# Patient Record
Sex: MALE | Race: BLACK OR AFRICAN AMERICAN | Employment: PART TIME | URBAN - METROPOLITAN AREA
[De-identification: names, ages, dates, MRNs, and addresses within clinical notes are randomized per-mention and may not be internally consistent; named-entity substitution may affect disease eponyms.]

---

## 2021-03-04 ENCOUNTER — HOSPITAL ENCOUNTER (EMERGENCY)
Age: 21
Discharge: HOME | End: 2021-03-04
Attending: EMERGENCY MEDICINE
Payer: COMMERCIAL

## 2021-03-04 ENCOUNTER — APPOINTMENT (OUTPATIENT)
Dept: GENERAL RADIOLOGY | Age: 21
End: 2021-03-04
Payer: COMMERCIAL

## 2021-03-04 VITALS
SYSTOLIC BLOOD PRESSURE: 121 MMHG | TEMPERATURE: 99.2 F | RESPIRATION RATE: 18 BRPM | HEART RATE: 112 BPM | OXYGEN SATURATION: 97 % | DIASTOLIC BLOOD PRESSURE: 81 MMHG

## 2021-03-04 DIAGNOSIS — Z20.822 SUSPECTED COVID-19 VIRUS INFECTION: Primary | ICD-10-CM

## 2021-03-04 LAB
A/G RATIO: 1.3 (ref 1.1–2.2)
ALBUMIN SERPL-MCNC: 4.3 G/DL (ref 3.4–5)
ALP BLD-CCNC: 96 U/L (ref 40–129)
ALT SERPL-CCNC: 54 U/L (ref 10–40)
ANION GAP SERPL CALCULATED.3IONS-SCNC: 6 MMOL/L (ref 3–16)
AST SERPL-CCNC: 30 U/L (ref 15–37)
BASOPHILS ABSOLUTE: 0 K/UL (ref 0–0.2)
BASOPHILS RELATIVE PERCENT: 0.2 %
BILIRUB SERPL-MCNC: 0.5 MG/DL (ref 0–1)
BILIRUBIN URINE: NEGATIVE
BLOOD, URINE: NEGATIVE
BUN BLDV-MCNC: 11 MG/DL (ref 7–20)
CALCIUM SERPL-MCNC: 9.6 MG/DL (ref 8.3–10.6)
CHLORIDE BLD-SCNC: 106 MMOL/L (ref 99–110)
CLARITY: CLEAR
CO2: 27 MMOL/L (ref 21–32)
COLOR: YELLOW
CREAT SERPL-MCNC: 1 MG/DL (ref 0.9–1.3)
EOSINOPHILS ABSOLUTE: 0.1 K/UL (ref 0–0.6)
EOSINOPHILS RELATIVE PERCENT: 1.2 %
GFR AFRICAN AMERICAN: >60
GFR NON-AFRICAN AMERICAN: >60
GLOBULIN: 3.4 G/DL
GLUCOSE BLD-MCNC: 120 MG/DL (ref 70–99)
GLUCOSE URINE: NEGATIVE MG/DL
HCT VFR BLD CALC: 46.7 % (ref 40.5–52.5)
HEMOGLOBIN: 14.9 G/DL (ref 13.5–17.5)
KETONES, URINE: NEGATIVE MG/DL
LEUKOCYTE ESTERASE, URINE: NEGATIVE
LYMPHOCYTES ABSOLUTE: 0.5 K/UL (ref 1–5.1)
LYMPHOCYTES RELATIVE PERCENT: 7.4 %
MCH RBC QN AUTO: 24.9 PG (ref 26–34)
MCHC RBC AUTO-ENTMCNC: 32 G/DL (ref 31–36)
MCV RBC AUTO: 78 FL (ref 80–100)
MICROSCOPIC EXAMINATION: NORMAL
MONOCYTES ABSOLUTE: 0.6 K/UL (ref 0–1.3)
MONOCYTES RELATIVE PERCENT: 8.1 %
NEUTROPHILS ABSOLUTE: 5.8 K/UL (ref 1.7–7.7)
NEUTROPHILS RELATIVE PERCENT: 83.1 %
NITRITE, URINE: NEGATIVE
PDW BLD-RTO: 14.4 % (ref 12.4–15.4)
PH UA: 8 (ref 5–8)
PLATELET # BLD: 237 K/UL (ref 135–450)
PMV BLD AUTO: 7.4 FL (ref 5–10.5)
POTASSIUM REFLEX MAGNESIUM: 4 MMOL/L (ref 3.5–5.1)
PROTEIN UA: NEGATIVE MG/DL
RAPID INFLUENZA  B AGN: NEGATIVE
RAPID INFLUENZA A AGN: NEGATIVE
RBC # BLD: 5.99 M/UL (ref 4.2–5.9)
SODIUM BLD-SCNC: 139 MMOL/L (ref 136–145)
SPECIFIC GRAVITY UA: 1.02 (ref 1–1.03)
TOTAL CK: 455 U/L (ref 39–308)
TOTAL PROTEIN: 7.7 G/DL (ref 6.4–8.2)
URINE REFLEX TO CULTURE: NORMAL
URINE TYPE: NORMAL
UROBILINOGEN, URINE: 1 E.U./DL
WBC # BLD: 7 K/UL (ref 4–11)

## 2021-03-04 PROCEDURE — 82550 ASSAY OF CK (CPK): CPT

## 2021-03-04 PROCEDURE — 80053 COMPREHEN METABOLIC PANEL: CPT

## 2021-03-04 PROCEDURE — 99284 EMERGENCY DEPT VISIT MOD MDM: CPT

## 2021-03-04 PROCEDURE — 96375 TX/PRO/DX INJ NEW DRUG ADDON: CPT

## 2021-03-04 PROCEDURE — 81003 URINALYSIS AUTO W/O SCOPE: CPT

## 2021-03-04 PROCEDURE — 87804 INFLUENZA ASSAY W/OPTIC: CPT

## 2021-03-04 PROCEDURE — 96374 THER/PROPH/DIAG INJ IV PUSH: CPT

## 2021-03-04 PROCEDURE — U0003 INFECTIOUS AGENT DETECTION BY NUCLEIC ACID (DNA OR RNA); SEVERE ACUTE RESPIRATORY SYNDROME CORONAVIRUS 2 (SARS-COV-2) (CORONAVIRUS DISEASE [COVID-19]), AMPLIFIED PROBE TECHNIQUE, MAKING USE OF HIGH THROUGHPUT TECHNOLOGIES AS DESCRIBED BY CMS-2020-01-R: HCPCS

## 2021-03-04 PROCEDURE — 2580000003 HC RX 258: Performed by: PHYSICIAN ASSISTANT

## 2021-03-04 PROCEDURE — 6360000002 HC RX W HCPCS: Performed by: PHYSICIAN ASSISTANT

## 2021-03-04 PROCEDURE — 71045 X-RAY EXAM CHEST 1 VIEW: CPT

## 2021-03-04 PROCEDURE — 85025 COMPLETE CBC W/AUTO DIFF WBC: CPT

## 2021-03-04 RX ORDER — KETOROLAC TROMETHAMINE 30 MG/ML
30 INJECTION, SOLUTION INTRAMUSCULAR; INTRAVENOUS ONCE
Status: COMPLETED | OUTPATIENT
Start: 2021-03-04 | End: 2021-03-04

## 2021-03-04 RX ORDER — 0.9 % SODIUM CHLORIDE 0.9 %
1000 INTRAVENOUS SOLUTION INTRAVENOUS ONCE
Status: COMPLETED | OUTPATIENT
Start: 2021-03-04 | End: 2021-03-04

## 2021-03-04 RX ORDER — ONDANSETRON 2 MG/ML
4 INJECTION INTRAMUSCULAR; INTRAVENOUS ONCE
Status: COMPLETED | OUTPATIENT
Start: 2021-03-04 | End: 2021-03-04

## 2021-03-04 RX ADMIN — KETOROLAC TROMETHAMINE 30 MG: 30 INJECTION, SOLUTION INTRAMUSCULAR at 11:13

## 2021-03-04 RX ADMIN — SODIUM CHLORIDE 1000 ML: 9 INJECTION, SOLUTION INTRAVENOUS at 12:19

## 2021-03-04 RX ADMIN — SODIUM CHLORIDE 1000 ML: 9 INJECTION, SOLUTION INTRAVENOUS at 11:12

## 2021-03-04 RX ADMIN — ONDANSETRON 4 MG: 2 INJECTION INTRAMUSCULAR; INTRAVENOUS at 11:12

## 2021-03-04 ASSESSMENT — ENCOUNTER SYMPTOMS
SHORTNESS OF BREATH: 0
NAUSEA: 1
VOMITING: 1
COUGH: 0
BACK PAIN: 0
COLOR CHANGE: 0
RESPIRATORY NEGATIVE: 1
DIARRHEA: 0
CONSTIPATION: 0
ABDOMINAL PAIN: 0

## 2021-03-04 ASSESSMENT — PAIN SCALES - GENERAL
PAINLEVEL_OUTOF10: 8
PAINLEVEL_OUTOF10: 8

## 2021-03-04 ASSESSMENT — PAIN DESCRIPTION - ORIENTATION: ORIENTATION: LOWER

## 2021-03-04 NOTE — ED PROVIDER NOTES
I independently performed a history and physical on Karl Dumont. All diagnostic, treatment, and disposition decisions were made by myself in conjunction with the advanced practice provider. Briefly, this is a 21 y.o. male here for cc of myalgia, arthralgia, palpitations, fevers. .    On exam, WDWN M, NAd, Heart RRR, Lungs CTAB, no r/r/w. Screenings        MDM  Acute febrile illness, probably COVID  Benign clinical exam  Hydrating, toradol. Feels improved with ED treatment. Gestalt for COVID. Patient Referrals:  Page Omarrocio  [phone]          Discharge Medications:  New Prescriptions    No medications on file       FINAL IMPRESSION  1. Suspected COVID-19 virus infection        Blood pressure 135/67, pulse 114, temperature 99.2 °F (37.3 °C), temperature source Oral, resp. rate 18, SpO2 97 %. For further details of [facility] emergency department encounter, please see documentation by advanced practice provider, Bobby.        Doris Agustin MD  03/04/21 7538

## 2021-03-04 NOTE — ED PROVIDER NOTES
[address] [facility]        Pt Name: Nigel Squires  MRN: [id number]  [place] 2000  Date of evaluation: 3/4/2021  Provider: KENDY Torres  [facility]P: [facility] (Inactive)     I have seen and evaluated this patient with my supervising physician [facility]       Chief Complaint   Patient presents with    Other     Chills, n/v, headache that began last night. Denies fevers or COVID exposure. HISTORY OF PRESENT ILLNESS   (Location, Timing/Onset, Context/Setting, Quality, Duration, Modifying Factors, Severity, Associated Signs and Symptoms)  Note limiting factors. Nigel Squires is a 21 y.o. male with past medical history of asthma who presents to the ED with complaint of chills, feelings of fever without documented temperature, mild headache, nausea, vomiting and myalgia/arthralgias. States symptoms began last night. Patient denies any known exposure to COVID-19. States he has had home Covid test per his work that a bit negative. Patient denies any sick contacts or recent travel. States he started feeling poorly last night and states today when he woke up it was worse so he came to the ED for further evaluation and treatment. Patient did receive influenza vaccine. Patient denies chest pain, shortness of breath or cough. Denies abdominal pain, urinary symptoms or change in bowel movements. Denies rashes or lesions. Denies lightheadedness or dizziness. Denies visual changes, speech services, neck pain/stiffness or other associated complaints at this time. Nursing Notes were all reviewed and agreed with or any disagreements were addressed in the HPI. REVIEW OF SYSTEMS    (2-9 systems for level 4, 10 or more for level 5)     Review of Systems   Constitutional: Positive for chills and fever. Negative for activity change, appetite change, diaphoresis and fatigue. HENT: Negative. Respiratory: Negative. Negative for cough and shortness of breath. Cardiovascular: Negative. Negative for chest pain, palpitations and leg swelling. Gastrointestinal: Positive for nausea and vomiting. Negative for abdominal pain, constipation and diarrhea. Genitourinary: Negative for decreased urine volume, difficulty urinating, dysuria, flank pain, frequency, hematuria and urgency. Musculoskeletal: Negative for arthralgias, back pain, myalgias, neck pain and neck stiffness. Skin: Negative for color change, pallor, rash and wound. Neurological: Positive for headaches. Negative for dizziness and light-headedness. Positives and Pertinent negatives as per HPI. Except as noted above in the ROS, all other systems were reviewed and negative. PAST MEDICAL HISTORY     Past Medical History:   Diagnosis Date    Asthma          SURGICAL HISTORY     Past Surgical History:   Procedure Laterality Date    ADENOIDECTOMY      TONSILLECTOMY           CURRENTMEDICATIONS       Discharge Medication List as of 3/4/2021  1:17 PM      CONTINUE these medications which have NOT CHANGED    Details   ondansetron (ZOFRAN ODT) 4 MG disintegrating tablet Take 1 tablet by mouth every 8 hours as needed for Nausea, Disp-20 tablet, R-0      fexofenadine (ALLEGRA) 180 MG tablet Take 180 mg by mouth daily      DOXYCYCLINE PO Take by mouth daily      albuterol (PROVENTIL HFA) 108 (90 BASE) MCG/ACT inhaler Inhale 2 puffs into the lungs every 6 hours as needed. ALLERGIES     Patient has no known allergies. FAMILYHISTORY     History reviewed. No pertinent family history.        SOCIAL HISTORY       Social History     Tobacco Use    Smoking status: Never Smoker   Substance Use Topics    Alcohol use: No    Drug use: No       SCREENINGS             PHYSICAL EXAM    (up to 7 for level 4, 8 or more for level 5)     ED Triage Vitals   BP Temp Temp Source Pulse Resp SpO2 Height Weight   03/04/21 0937 03/04/21 9433 03/04/21 3415 03/04/21 0937 03/04/21 0937 03/04/21 0937 -- --   139/87 100.9 °F (38.3 °C) Temporal 118 18 98 %         Physical Exam  Constitutional:       General: He is not in acute distress. Appearance: Normal appearance. He is well-developed. He is not ill-appearing, toxic-appearing or diaphoretic. HENT:      Head: Normocephalic and atraumatic. Right Ear: External ear normal.      Left Ear: External ear normal.      Nose: Nose normal. No congestion or rhinorrhea. Mouth/Throat:      Mouth: Mucous membranes are moist.      Pharynx: No oropharyngeal exudate or posterior oropharyngeal erythema. Eyes:      General:         Right eye: No discharge. Left eye: No discharge. Conjunctiva/sclera: Conjunctivae normal.   Neck:      Musculoskeletal: Normal range of motion and neck supple. Cardiovascular:      Rate and Rhythm: Regular rhythm. Tachycardia present. Pulses: Normal pulses. Heart sounds: Normal heart sounds. No murmur. No friction rub. No gallop. Comments: 2+ radial pulses bilaterally. No pedal edema. No calf tenderness. No JVD. Pulmonary:      Effort: Pulmonary effort is normal. No respiratory distress. Breath sounds: Normal breath sounds. No stridor. No wheezing, rhonchi or rales. Chest:      Chest wall: No tenderness. Abdominal:      General: Abdomen is flat. Bowel sounds are normal. There is no distension. Palpations: Abdomen is soft. There is no mass. Tenderness: There is no abdominal tenderness. There is no right CVA tenderness, left CVA tenderness, guarding or rebound. Hernia: No hernia is present. Musculoskeletal: Normal range of motion. Skin:     General: Skin is warm and dry. Coloration: Skin is not pale. Findings: No erythema or rash. Neurological:      Mental Status: He is alert and oriented to person, place, and time.    Psychiatric:         Behavior: Behavior normal.         DIAGNOSTIC RESULTS LABS:    Labs Reviewed   CK - Abnormal; Notable for the following components:       Result Value    Total  (*)     All other components within normal limits    Narrative:     Performed at:  [facility]  [address] [facility],  [address], [address]   Phone [phone]   CBC WITH AUTO DIFFERENTIAL - Abnormal; Notable for the following components:    RBC 5.99 (*)     MCV 78.0 (*)     MCH 24.9 (*)     Lymphocytes Absolute 0.5 (*)     All other components within normal limits    Narrative:     Performed at:  [facility] [address] [facility],  [address], [address]   Phone [phone]   COMPREHENSIVE METABOLIC PANEL W/ REFLEX TO MG FOR LOW K - Abnormal; Notable for the following components:    Glucose 120 (*)     ALT 54 (*)     All other components within normal limits    Narrative:     Performed at:  [facility] [address] [facility],  [address], [address]   Phone [phone]   RAPID INFLUENZA A/B ANTIGENS    Narrative:     Performed at:  [facility] [address],  [address], [address]   Phone [phone]   URINE RT REFLEX TO CULTURE    Narrative:     Performed at:  [facility] [address],  [address], [address]   Phone [phone]       All other labs were within normal range or not returned as of this dictation. EKG: All EKG's are interpreted by the Emergency Department Physician in the absence of a cardiologist.  Please see their note for interpretation of EKG.       RADIOLOGY:   Non-plain film images such as CT, Ultrasound and MRI are read by the radiologist. Plain radiographic images are visualized and preliminarily interpreted by the ED Provider with the below findings:        Interpretation per the Radiologist below, if available at the time of this note:    XR CHEST PORTABLE   Final Result   No radiographic evidence of acute pulmonary disease. Xr Chest Portable    Result Date: 3/4/2021  EXAMINATION: ONE XRAY VIEW OF THE CHEST 3/4/2021 10:28 am COMPARISON: Chest x-ray dated 04/24/2011. HISTORY: ORDERING SYSTEM PROVIDED HISTORY: cough - fever TECHNOLOGIST PROVIDED HISTORY: Reason for exam:->cough - fever Reason for Exam: Other (Chills, n/v, headache that began last night. Denies fevers or COVID exposure. ) Acuity: Unknown Type of Exam: Unknown FINDINGS: HEART/MEDIASTINUM: The cardiomediastinal silhouette is within normal limits. PLEURA/LUNGS: There are no focal consolidations or pleural effusions. There is no appreciable pneumothorax. BONES/SOFT TISSUE: No acute abnormality. No radiographic evidence of acute pulmonary disease. PROCEDURES   Unless otherwise noted below, none     Procedures    CRITICAL CARE TIME   N/A    CONSULTS:  None      EMERGENCY DEPARTMENT COURSE and DIFFERENTIAL DIAGNOSIS/MDM:   Vitals:    Vitals:    03/04/21 1130 03/04/21 1200 03/04/21 1230 03/04/21 1300   BP: (!) 138/92 132/66 135/67 121/81   Pulse: 120 123 114 112   Resp:       Temp:  99.2 °F (37.3 °C)     TempSrc:  Oral     SpO2: 96% 96% 97% 97%       Patient was given the following medications:  Medications   0.9 % sodium chloride bolus (0 mLs Intravenous Stopped 3/4/21 1219)   ondansetron (ZOFRAN) injection 4 mg (4 mg Intravenous Given 3/4/21 1112)   ketorolac (TORADOL) injection 30 mg (30 mg Intravenous Given 3/4/21 1113)   0.9 % sodium chloride bolus (0 mLs Intravenous Stopped 3/4/21 1309)           Patient is a 51-year-old male who presents to the ED with suspected Covid infection. Patient has myalgias, arthralgias, headache, nausea, vomiting and chills. Fever here in the emergency department. Also tachycardic. Given Toradol and Zofran with improvement of symptoms. Given fluids here with improvement of heart rate.   Still slightly tachycardic but believe most likely due to some mild dehydration given patient's nausea, vomiting and symptomology. Flu swab was negative. Urinalysis unremarkable. . CBC showed normal white count, hemoglobin and platelets. CMP showed ALT 54 otherwise unremarkable. Chest x-ray unremarkable. Given history and physical examination patient suffering from febrile illness with suspected COVID-19 infection. Will need to self quarantine at home pending Covid results. Covid swab obtained and will take up to 3 business days to result. Symptom control with over-the-counter medication at home. Follow-up with PCP. Return the ED for any worsening symptoms. Low suspicion for respiratory distress, pneumonia, sepsis, meningitis, surgical abdomen, significant dehydration, electrolyte abnormality, pyelonephritis, acute cystitis or other emergent etiology at this time. FINAL IMPRESSION      1.  Suspected COVID-19 virus infection          DISPOSITION/PLAN   DISPOSITION Decision To Discharge 03/04/2021 12:59:14 PM      PATIENT REFERREDTO:  Page Lang  [phone]          DISCHARGE MEDICATIONS:  Discharge Medication List as of 3/4/2021  1:17 PM          DISCONTINUED MEDICATIONS:  Discharge Medication List as of 3/4/2021  1:17 PM                 (Please note that portions of this note were completed with a voice recognition program.  Efforts were made to edit the dictations but occasionally words are mis-transcribed.)    KENDY Torres (electronically signed)         KENDY Guajardo  03/04/21 4030

## 2021-03-04 NOTE — ED NOTES
PT in from home with complaints of headache, nausea, vomiting, and chills since last night that worsened this morning. PT denies any exposure to COVID that he is aware of. PT denies CP or SOB at this time. Alert and oriented X4, ambulatory at baseline. Will continue with current plan of care.      Rosenda Lira RN  03/04/21 0126

## 2021-03-05 LAB — SARS-COV-2: NOT DETECTED
